# Patient Record
Sex: MALE | Race: WHITE | Employment: OTHER | URBAN - METROPOLITAN AREA
[De-identification: names, ages, dates, MRNs, and addresses within clinical notes are randomized per-mention and may not be internally consistent; named-entity substitution may affect disease eponyms.]

---

## 2024-10-03 ENCOUNTER — HOSPITAL ENCOUNTER (OUTPATIENT)
Age: 74
Discharge: HOME | End: 2024-10-03
Payer: MEDICARE

## 2024-10-03 VITALS
HEART RATE: 65 BPM | SYSTOLIC BLOOD PRESSURE: 132 MMHG | DIASTOLIC BLOOD PRESSURE: 50 MMHG | RESPIRATION RATE: 16 BRPM | OXYGEN SATURATION: 98 % | TEMPERATURE: 99 F

## 2024-10-03 DIAGNOSIS — Z48.02 ENCOUNTER FOR REMOVAL OF SUTURES: ICD-10-CM

## 2024-10-03 DIAGNOSIS — S61.213D LACERATION OF LEFT MIDDLE FINGER WITHOUT FOREIGN BODY WITHOUT DAMAGE TO NAIL, SUBSEQUENT ENCOUNTER: Primary | ICD-10-CM

## 2024-10-03 PROCEDURE — 99202 OFFICE O/P NEW SF 15 MIN: CPT

## 2024-10-03 NOTE — ED INITIAL ASSESSMENT (HPI)
Patient arrives ambulatory with need for suture removal from left 2nd finger. Stitches placed 13 days ago.

## 2024-10-03 NOTE — ED PROVIDER NOTES
missy Seen in: [facility]    History     Chief Complaint   Patient presents with    Sut Stap RingRemoval     Stated Complaint: stitch removal    HPI    HPI:     74 year old male who presents to immediate care requesting suture removal.  Patient states that he underwent left third digit laceration repair at an outside facility 13 days ago.  Patient denies other injury, head/neck injury or pain, decreased range of motion, swelling, ecchymosis, erythema, weakness, paraesthesias, purulent drainage, fever, chills.      Past Medical History:    Cancer (HCC)    prostate    Coronary atherosclerosis    Diabetes (HCC)    Esophageal reflux    High blood pressure    High cholesterol       Past Surgical History:   Procedure Laterality Date    Cholecystectomy      Laparoscopy, surgical prostatectomy, retropubic radical, w/nerve sparing      Tonsillectomy      Total knee replacement      x3, bilateral            History reviewed. No pertinent family history.    Social History     Socioeconomic History    Marital status:    Tobacco Use    Smoking status: Former     Types: Cigarettes    Smokeless tobacco: Never    Tobacco comments:     Pt quit smoking in 1986   Substance and Sexual Activity    Alcohol use: Not Currently    Drug use: Never       Review of Systems    Positive for stated complaint: stitch removal  Other systems are as noted in HPI.  Constitutional and vital signs reviewed.      All other systems reviewed and negative except as noted above.    PSFH elements reviewed from today and agreed except as otherwise stated in HPI.    Physical Exam     ED Triage Vitals [10/03/24 1622]   /50   Pulse 65   Resp 16   Temp 98.5 °F (36.9 °C)   Temp src Temporal   SpO2 98 %   O2 Device None (Room air)       Current:/50   Pulse 65   Temp 98.5 °F (36.9 °C) (Temporal)   Resp 16   SpO2 98%     PULSE OX within normal limits on room air as interpreted by this provider.      Physical  Exam      Constitutional: The patient is cooperative. Appears well-developed and well-nourished.  No acute distress.  Head: Normocephalic/atraumatic.  Neck: The neck is supple.  No meningeal signs.  Respiratory: Respiratory effort was normal.  There is no stridor.  Air entry is equal.  Cardiovascular: Regular rate and rhythm.  Capillary refill is brisk.    Genitourinary: Not examined.  Lymphatic: No gross lymphadenopathy noted.  Musculoskeletal: Left upper extremity-Left upper extremity without acute bony deformity.  A well-healed laceration with intact sutures is present at the left third digit.  No erythema, edema, purulent drainage or warmth.  Distal pulses intact.  Capillary refill normal.  Motor intact distally.  Sensory intact distally.  No ecchymosis.  Full range of motion of left upper extremity.  No compartment syndrome.  No edema.  Remainder of musculoskeletal system is grossly intact.  There is no obvious deformity.  Neurological: Gross motor movement is intact in all 4 extremities.  Patient exhibits normal speech.  Skin: Skin is normal to inspection, except as documented.  Warm and dry.  No obvious bruising.  No obvious rash.    ED Course   Labs Reviewed - No data to display    PROCEDURE: Sutures removed intact by this provider.  Patient tolerated procedure well without complication.  MDM     Physical exam remained stable as previously documented.  Physical exam findings discussed with patient.    I have given the patient instructions regarding their diagnoses, expectations, follow up, and ER precautions. I explained to the patient that emergent conditions may arise and to go to the ER for new, worsening or any persistent conditions. I've explained the importance of following up with their doctor as instructed. The patient verbalized understanding of the discharge instructions and plan.    Disposition and Plan     Clinical Impression:  1. Laceration of left middle finger without foreign body without  damage to nail, subsequent encounter    2. Encounter for removal of sutures        Disposition:  Discharge    Follow-up:  Fer Rodriguez MD  [address]   [facility] [address]  [phone]    Call in 1 day  For follow-up    Jason Maynard MD  [address] [facility] [address]  [phone]    Call in 1 day  For follow-up      Medications Prescribed:  Current Discharge Medication List